# Patient Record
Sex: FEMALE | Race: WHITE | NOT HISPANIC OR LATINO | Employment: UNEMPLOYED | URBAN - METROPOLITAN AREA
[De-identification: names, ages, dates, MRNs, and addresses within clinical notes are randomized per-mention and may not be internally consistent; named-entity substitution may affect disease eponyms.]

---

## 2024-02-02 ENCOUNTER — OFFICE VISIT (OUTPATIENT)
Dept: OTOLARYNGOLOGY | Facility: CLINIC | Age: 29
End: 2024-02-02
Payer: COMMERCIAL

## 2024-02-02 ENCOUNTER — CLINICAL SUPPORT (OUTPATIENT)
Dept: AUDIOLOGY | Facility: CLINIC | Age: 29
End: 2024-02-02
Payer: COMMERCIAL

## 2024-02-02 VITALS — BODY MASS INDEX: 22.58 KG/M2 | HEIGHT: 61 IN | WEIGHT: 119.6 LBS | TEMPERATURE: 97.3 F

## 2024-02-02 DIAGNOSIS — J32.9 CHRONIC SINUSITIS, UNSPECIFIED LOCATION: Primary | ICD-10-CM

## 2024-02-02 DIAGNOSIS — H90.6 MIXED CONDUCTIVE AND SENSORINEURAL HEARING LOSS OF BOTH EARS: Primary | ICD-10-CM

## 2024-02-02 DIAGNOSIS — B99.9 INFECTION: ICD-10-CM

## 2024-02-02 DIAGNOSIS — J31.0 CHRONIC RHINITIS: ICD-10-CM

## 2024-02-02 PROCEDURE — 31231 NASAL ENDOSCOPY DX: CPT | Performed by: OTOLARYNGOLOGY

## 2024-02-02 PROCEDURE — 92550 TYMPANOMETRY & REFLEX THRESH: CPT | Performed by: AUDIOLOGIST

## 2024-02-02 PROCEDURE — 99203 OFFICE O/P NEW LOW 30 MIN: CPT | Performed by: OTOLARYNGOLOGY

## 2024-02-02 PROCEDURE — 92557 COMPREHENSIVE HEARING TEST: CPT | Performed by: AUDIOLOGIST

## 2024-02-02 RX ORDER — AMOXICILLIN AND CLAVULANATE POTASSIUM 875; 125 MG/1; MG/1
TABLET, FILM COATED ORAL
Qty: 42 TABLET | Refills: 0 | Status: SHIPPED | OUTPATIENT
Start: 2024-02-02

## 2024-02-02 RX ORDER — PREDNISONE 10 MG/1
TABLET ORAL
Qty: 39 TABLET | Refills: 0 | Status: SHIPPED | OUTPATIENT
Start: 2024-02-02 | End: 2024-02-14

## 2024-02-02 RX ORDER — FLUTICASONE PROPIONATE 50 MCG
SPRAY, SUSPENSION (ML) NASAL
Qty: 16 G | Refills: 11 | Status: SHIPPED | OUTPATIENT
Start: 2024-02-02

## 2024-02-02 NOTE — PROGRESS NOTES
"GARTH Zapien is a 28 y.o. female history of cleft palate.  She does not recall any surgery to repair but physical exam suggests that an attempted repair was performed.  She is here today with hearing loss which fluctuates in intensity.  She has mixed hearing loss on audiogram today left greater than right.  Type B tympanograms bilaterally.  She has chronic nasal and sinus symptoms and drainage facial pressure and congestion as well.  No recent CT scan.  She is not on any prescription medications for this.  She uses Afrin with regularity      Past Medical History:   Diagnosis Date    Candidal stomatitis 2017    Thrush    Encounter for full-term uncomplicated delivery      (spontaneous vaginal delivery)    Encounter for immunization     Varicella vaccination    Other problems related to lifestyle     Deliberate self-cutting    Personal history of other drug therapy     History of vaccination against human papillomavirus            Medications:   No current outpatient medications on file.     Allergies:  No Known Allergies     Physical Exam:  Last Recorded Vitals  Temperature 36.3 °C (97.3 °F), height 1.549 m (5' 1\"), weight 54.3 kg (119 lb 9.6 oz).  General:     General appearance: Well-developed, well-nourished in no acute distress.       Voice: Hypernasal speech     Head/face: Normal appearance; nontender to palpation     Facial nerve function: Normal and symmetric bilaterally.    Oral/oropharynx:     Oral vestibule: Normal labial and gingival mucosa     Tongue/floor of mouth: Normal without lesion     Oropharynx: Clear.  No lesions present of the hard/soft palate, posterior pharynx.  Oropharynx with scarring of the soft palate with a small residual cleft.    Neck:     Neck: Normal appearance, trachea midline     Salivary glands: Normal to palpation bilaterally     Lymph nodes: No cervical lymphadenopathy to palpation     Thyroid: No thyromegaly.  No palpable nodules     Range of motion: " Normal    Neurological:     Cortical functions: Alert and oriented x3, appropriate affect       Larynx/hypopharynx:     Laryngeal findings: Mirror exam inadequate or limited secondary to enlarged base of tongue and/or excessive gagging    Ear:     Ear canal: Normal bilaterally     Tympanic membrane: Intact bilaterally.  Serous effusion bilaterally.     Pinna: Normal bilaterally     Hearing:  Gross hearing assessment normal by voice    Nose:     Visualized using: Flexible nasal endoscopy utilized secondary to inadequate anterior rhinoscopy  Nasopharynx: Inadequate mirror examination as above, endoscopy demonstrates normal nasopharynx without obstruction or mass     Nasal dorsum: Nontraumatic midline appearance     Septum: Midline     Inferior turbinates: Normally sized     Mucosa: Bilateral, thin purulence from the middle meatus bilaterally      Assessment/Plan   She has bilateral middle ear effusions on exam.  She has evidence of purulence in the nasal cavity bilaterally.  Recommend antibiotics, prednisone, nasal steroid spray.  CT scan ordered after this is done.  I will see her back and we did discuss consideration of replacement tympanostomy tubes for her hearing if the effusions remain         Yobani Monk MD

## 2024-02-02 NOTE — PROGRESS NOTES
AUDIOLOGY ADULT AUDIOMETRIC EVALUATION    Name:  Sofi Zapien  :  1995  Age:  28 y.o.  Date of Evaluation:  2024    Reason for visit: Ms. Zapien is seen in the clinic today at the request of Yobani Monk MD in otolaryngology for an audiologic evaluation.     EVALUATION  See scanned audiogram: “Media” > “Audiology Report” > Document ID [id number].    RESULTS  Otoscopic Evaluation  Right Ear: minimal non-occluding cerumen with visualization of the tympanic membrane  Left Ear: minimal non-occluding cerumen with visualization of the tympanic membrane    Immittance Measures  Tympanometry:  Right Ear: Type B, reduced tympanic membrane mobility  Left Ear: Type B, reduced tympanic membrane mobility    Acoustic Reflexes:  Ipsilateral Right Ear: absent from 500 Hz to 4000 Hz  Ipsilateral Left Ear: absent from 500 Hz to 4000 Hz  Contralateral Right Ear: did not evaluate  Contralateral Left Ear: did not evaluate    Distortion Product Otoacoustic Emissions (DPOAEs)  Right Ear: absent from 1000 Hz to 8000 Hz  Left Ear: absent from 1000 Hz to 8000 Hz    Audiometry  Test Technique and Reliability:   Standard audiometry via supra-aural headphones and insert earphones for confirmation. Reliability is good.    Pure tone air and bone conduction audiometry:  Right Ear: moderate mixed hearing loss  Left Ear: essentially moderate to moderately-severe mixed hearing loss    Speech Audiometry:  Speech Reception Threshold (SRT) Right Ear: 50 dB HL  Speech Reception Threshold (SRT) Left Ear: 70 dB HL with 40 dB HL of masking  Word Recognition Score (WRS) Right Ear: Excellent, 100% at 90 dB HL  Word Recognition Score (WRS) Left Ear: Excellent, 100% at 95 dB HL with 65 dB HL of masking    IMPRESSIONS  Audiometric evaluation revealed mixed hearing loss bilaterally. No prior audiogram available for comparison; however, patient reports [place] conductive hearing [place]. The absence of  acoustic reflexes is consistent with a middle ear disorder, hearing loss in the stimulated ear, and/or interruption of neural innervation of the stapedius muscle. Absent Distortion Product Otoacoustic Emissions (DPOAEs) are consistent with abnormal cochlear outer hair cell function and some degree of hearing loss at those frequencies.    RECOMMENDATIONS  - Follow up with otolaryngology today as scheduled.  - Audiologic evaluation in conjunction with otologic care, if an acute change is noted, and/or annually.  - Consider hearing aids pending medical clearance. Contact insurance to determine if there is an applicable benefit and where it can be used. Schedule a hearing aid evaluation appointment should she wish to proceed with hearing aids through our clinic.  - Follow-up with medical care team as planned.    PATIENT EDUCATION  Discussed results, impressions and recommendations with the patient. Questions were addressed and the patient was encouraged to contact our office should concerns arise.    Time for this encounter: [id number]    Wade Ramirez, CCC-A  Licensed Audiologist

## 2024-02-05 ENCOUNTER — APPOINTMENT (OUTPATIENT)
Dept: OTOLARYNGOLOGY | Facility: CLINIC | Age: 29
End: 2024-02-05
Payer: COMMERCIAL

## 2024-02-15 ENCOUNTER — TELEPHONE (OUTPATIENT)
Dept: OTOLARYNGOLOGY | Facility: CLINIC | Age: 29
End: 2024-02-15
Payer: COMMERCIAL

## 2024-02-15 NOTE — TELEPHONE ENCOUNTER
[facility] denying CT you ordered because they want documentation of recent failure of greater than 4 weeks antibiotics, steroids or antihistamines.

## 2024-02-16 NOTE — TELEPHONE ENCOUNTER
I l/m asking for an update towards the end of the abx and we can move forward after. I did leave in the message this needs to be scheduled after completion of all meds.

## 2024-03-05 ENCOUNTER — APPOINTMENT (OUTPATIENT)
Dept: RADIOLOGY | Facility: HOSPITAL | Age: 29
End: 2024-03-05
Payer: COMMERCIAL

## 2024-03-08 ENCOUNTER — OFFICE VISIT (OUTPATIENT)
Dept: OTOLARYNGOLOGY | Facility: CLINIC | Age: 29
End: 2024-03-08
Payer: COMMERCIAL

## 2024-03-08 VITALS — BODY MASS INDEX: 22.36 KG/M2 | HEIGHT: 61 IN | TEMPERATURE: 97.3 F | WEIGHT: 118.4 LBS

## 2024-03-08 DIAGNOSIS — H69.93 DYSFUNCTION OF EUSTACHIAN TUBE, BILATERAL: ICD-10-CM

## 2024-03-08 DIAGNOSIS — J31.0 CHRONIC RHINITIS: Primary | ICD-10-CM

## 2024-03-08 PROCEDURE — 99213 OFFICE O/P EST LOW 20 MIN: CPT | Performed by: OTOLARYNGOLOGY

## 2024-03-08 NOTE — PROGRESS NOTES
"GARTH  Sofi Zapien is a 29 y.o. female Follow-up middle ear effusions and sinusitis.  She did not get CT scan.  Her symptoms are improved.  Still with popping in the ears.   No facial pain or much drainage    PRIOR HISTORY: History of cleft palate.  She does not recall any surgery to repair but physical exam suggests that an attempted repair was performed.  She is here today with hearing loss which fluctuates in intensity.  She has mixed hearing loss on audiogram today left greater than right.  Type B tympanograms bilaterally.  She has chronic nasal and sinus symptoms and drainage facial pressure and congestion as well.  No recent CT scan.  She is not on any prescription medications for this.  She uses Afrin with regularity      Past Medical History:   Diagnosis Date    Candidal stomatitis 2017    Thrush    Encounter for full-term uncomplicated delivery      (spontaneous vaginal delivery)    Encounter for immunization     Varicella vaccination    Other problems related to lifestyle     Deliberate self-cutting    Personal history of other drug therapy     History of vaccination against human papillomavirus            Medications:     Current Outpatient Medications:     fluticasone (Flonase) 50 mcg/actuation nasal spray, Administer 2 pumps to each nostril once daily, Disp: 16 g, Rfl: 11    amoxicillin-pot clavulanate (Augmentin) 875-125 mg tablet, Take 1 tablet by mouth twice daily for 21 days. (Patient not taking: Reported on 3/8/2024), Disp: 42 tablet, Rfl: 0     Allergies:  No Known Allergies     Physical Exam:  Last Recorded Vitals  Temperature 36.3 °C (97.3 °F), height 1.549 m (5' 1\"), weight 53.7 kg (118 lb 6.4 oz).  General:     General appearance: Well-developed, well-nourished in no acute distress.       Voice: Hypernasal speech     Head/face: Normal appearance; nontender to palpation     Facial nerve function: Normal and symmetric bilaterally.    Oral/oropharynx:     Oral vestibule: Normal labial and " gingival mucosa     Tongue/floor of mouth: Normal without lesion     Oropharynx: Clear.  No lesions present of the hard/soft palate, posterior pharynx.  Oropharynx with scarring of the soft palate with a small residual cleft.    Neck:     Neck: Normal appearance, trachea midline     Salivary glands: Normal to palpation bilaterally     Lymph nodes: No cervical lymphadenopathy to palpation     Thyroid: No thyromegaly.  No palpable nodules     Range of motion: Normal    Neurological:     Cortical functions: Alert and oriented x3, appropriate affect       Larynx/hypopharynx:     Laryngeal findings: Mirror exam inadequate or limited secondary to enlarged base of tongue and/or excessive gagging    Ear:     Ear canal: Normal bilaterally     Tympanic membrane: Intact bilaterally.  SHIVA bilat     Pinna: Normal bilaterally     Hearing:  Gross hearing assessment normal by voice    Nose:     Visualized using: Anterior rhinoscopy     Nasal dorsum: Nontraumatic midline appearance     Septum: Midline     Inferior turbinates: Normally sized     Mucosa: Bilateral, healthy appearing      Assessment/Plan   No evidence of sinusitis and effusions appear to have resolved.  Rec flonase daily and fu 3 mos, sooner ar Monk MD

## 2024-06-14 ENCOUNTER — APPOINTMENT (OUTPATIENT)
Dept: AUDIOLOGY | Facility: CLINIC | Age: 29
End: 2024-06-14
Payer: COMMERCIAL

## 2024-06-14 ENCOUNTER — APPOINTMENT (OUTPATIENT)
Dept: OTOLARYNGOLOGY | Facility: CLINIC | Age: 29
End: 2024-06-14
Payer: COMMERCIAL